# Patient Record
Sex: FEMALE | URBAN - NONMETROPOLITAN AREA
[De-identification: names, ages, dates, MRNs, and addresses within clinical notes are randomized per-mention and may not be internally consistent; named-entity substitution may affect disease eponyms.]

---

## 2018-09-26 ENCOUNTER — APPOINTMENT (OUTPATIENT)
Age: 43
Setting detail: DERMATOLOGY
End: 2018-09-27

## 2018-09-26 DIAGNOSIS — L40.0 PSORIASIS VULGARIS: ICD-10-CM

## 2018-09-26 DIAGNOSIS — B35.3 TINEA PEDIS: ICD-10-CM

## 2018-09-26 DIAGNOSIS — B07.8 OTHER VIRAL WARTS: ICD-10-CM

## 2018-09-26 DIAGNOSIS — L57.0 ACTINIC KERATOSIS: ICD-10-CM

## 2018-09-26 DIAGNOSIS — D485 NEOPLASM OF UNCERTAIN BEHAVIOR OF SKIN: ICD-10-CM

## 2018-09-26 PROBLEM — D48.5 NEOPLASM OF UNCERTAIN BEHAVIOR OF SKIN: Status: ACTIVE | Noted: 2018-09-26

## 2018-09-26 PROCEDURE — 17000 DESTRUCT PREMALG LESION: CPT | Mod: 59

## 2018-09-26 PROCEDURE — OTHER PRESCRIPTION: OTHER

## 2018-09-26 PROCEDURE — 99203 OFFICE O/P NEW LOW 30 MIN: CPT | Mod: 25

## 2018-09-26 PROCEDURE — 11301 SHAVE SKIN LESION 0.6-1.0 CM: CPT | Mod: 59

## 2018-09-26 PROCEDURE — OTHER BIOPSY BY SHAVE METHOD: OTHER

## 2018-09-26 PROCEDURE — 17110 DESTRUCT B9 LESION 1-14: CPT

## 2018-09-26 PROCEDURE — OTHER LIQUID NITROGEN: OTHER

## 2018-09-26 PROCEDURE — 17003 DESTRUCT PREMALG LES 2-14: CPT

## 2018-09-26 PROCEDURE — OTHER SHAVE REMOVAL: OTHER

## 2018-09-26 PROCEDURE — 11100: CPT | Mod: 59

## 2018-09-26 RX ORDER — CLOBETASOL PROPIONATE 0.5 MG/G
CREAM TOPICAL BID
Qty: 1 | Refills: 3 | Status: ERX | COMMUNITY
Start: 2018-09-26

## 2018-09-26 RX ORDER — KETOCONAZOLE 20 MG/G
CREAM TOPICAL
Qty: 1 | Refills: 1 | Status: ERX | COMMUNITY
Start: 2018-09-26

## 2018-09-26 ASSESSMENT — LOCATION DETAILED DESCRIPTION DERM
LOCATION DETAILED: RIGHT PROXIMAL PRETIBIAL REGION
LOCATION DETAILED: RIGHT MEDIAL PLANTAR MIDFOOT
LOCATION DETAILED: RIGHT DORSAL FOOT
LOCATION DETAILED: RIGHT ELBOW
LOCATION DETAILED: RIGHT LATERAL ABDOMEN
LOCATION DETAILED: LEFT CLAVICULAR SKIN
LOCATION DETAILED: LEFT PROXIMAL DORSAL FOREARM
LOCATION DETAILED: RIGHT INFERIOR LATERAL NECK

## 2018-09-26 ASSESSMENT — LOCATION SIMPLE DESCRIPTION DERM
LOCATION SIMPLE: LEFT CLAVICULAR SKIN
LOCATION SIMPLE: ABDOMEN
LOCATION SIMPLE: RIGHT FOOT
LOCATION SIMPLE: RIGHT ELBOW
LOCATION SIMPLE: RIGHT ANTERIOR NECK
LOCATION SIMPLE: LEFT FOREARM
LOCATION SIMPLE: RIGHT PLANTAR SURFACE
LOCATION SIMPLE: RIGHT PRETIBIAL REGION

## 2018-09-26 ASSESSMENT — LOCATION ZONE DERM
LOCATION ZONE: FEET
LOCATION ZONE: NECK
LOCATION ZONE: ARM
LOCATION ZONE: TRUNK
LOCATION ZONE: LEG

## 2018-09-26 ASSESSMENT — TOTAL NUMBER OF LESIONS: # OF LESIONS?: 2

## 2018-09-26 NOTE — PROCEDURE: SHAVE REMOVAL
Consent was obtained from the patient. The risks and benefits to therapy were discussed in detail. Specifically, the risks of infection, scarring, bleeding, prolonged wound healing, incomplete removal, allergy to anesthesia, nerve injury and recurrence were addressed. Prior to the procedure, the treatment site was clearly identified and confirmed by the patient. All components of Universal Protocol/PAUSE Rule completed.
X Size Of Lesion In Cm (Optional): 0
Medical Necessity Clause: This procedure was medically necessary because the lesion that was treated was:
Anesthesia Volume In Cc: 10
Billing Type: Third-Party Bill
Anesthesia Type: 1% lidocaine with epinephrine
Render Post-Care Instructions In Note?: no
Hemostasis: Electrocautery
Medical Necessity Information: It is in your best interest to select a reason for this procedure from the list below. All of these items fulfill various CMS LCD requirements except the new and changing color options.
Detail Level: Detailed
Wound Care: Petrolatum
Notification Instructions: Patient will be notified of biopsy results. However, patient instructed to call the office if not contacted within 2 weeks.
Size Of Lesion In Cm (Required): 0.7
Post-Care Instructions: I reviewed with the patient in detail post-care instructions. Patient is to keep the biopsy site dry overnight, and then apply bacitracin twice daily until healed. Patient may apply hydrogen peroxide soaks to remove any crusting.
Was A Bandage Applied: Yes
Biopsy Method: Dermablade

## 2018-09-26 NOTE — PROCEDURE: BIOPSY BY SHAVE METHOD
Render Post-Care Instructions In Note?: yes
Dressing: bandage
Bill For Surgical Tray: no
Post-Care Instructions: I reviewed with the patient in detail post-care instructions. Patient is to keep the biopsy site dry overnight, and then apply bacitracin twice daily until healed. Patient may apply hydrogen peroxide soaks to remove any crusting.
Billing Type: Third-Party Bill
Anesthesia Type: 1% lidocaine with epinephrine
Curettage Text: The wound bed was treated with curettage after the biopsy was performed.
Anesthesia Volume In Cc (Will Not Render If 0): 0.5
Cryotherapy Text: The wound bed was treated with cryotherapy after the biopsy was performed.
Biopsy Type: H and E
X Size Of Lesion In Cm: 0
Type Of Destruction Used: Curettage
Biopsy Method: Dermablade
Detail Level: Detailed
Wound Care: Petrolatum
Consent: Written consent was obtained and risks were reviewed including but not limited to scarring, infection, bleeding, scabbing, incomplete removal, nerve damage and allergy to anesthesia.
Depth Of Biopsy: dermis
Notification Instructions: Patient will be notified of biopsy results. However, patient instructed to call the office if not contacted within 2 weeks.
Hemostasis: Electrocautery
Silver Nitrate Text: The wound bed was treated with silver nitrate after the biopsy was performed.
Electrodesiccation And Curettage Text: The wound bed was treated with electrodesiccation and curettage after the biopsy was performed.
Electrodesiccation Text: The wound bed was treated with electrodesiccation after the biopsy was performed.

## 2018-09-26 NOTE — PROCEDURE: LIQUID NITROGEN
Medical Necessity Information: It is in your best interest to select a reason for this procedure from the list below. All of these items fulfill various CMS LCD requirements except the new and changing color options.
Total Number Of Aks Treated: 2
Pared With?: curette
Detail Level: Zone
Add 52 Modifier (Optional): no
Medical Necessity Clause: This procedure was medically necessary because the lesions that were treated were:
Duration Of Freeze Thaw-Cycle (Seconds): 0
Post-Care Instructions: I reviewed with the patient in detail post-care instructions. Patient is to wear sunprotection, and avoid picking at any of the treated lesions. Pt may apply Vaseline to crusted or scabbing areas. Will do chemical peel in fall to chest
Detail Level: Detailed
Post-Care Instructions: I reviewed with the patient in detail post-care instructions. Patient is to wear sunprotection, and avoid picking at any of the treated lesions. Pt may apply Vaseline to crusted or scabbing areas.
Consent: The patient's consent was obtained including but not limited to risks of crusting, scabbing, blistering, scarring, darker or lighter pigmentary change, recurrence, incomplete removal and infection.

## 2018-10-25 ENCOUNTER — APPOINTMENT (OUTPATIENT)
Age: 43
Setting detail: DERMATOLOGY
End: 2018-11-01

## 2018-10-25 ENCOUNTER — RX ONLY (RX ONLY)
Age: 43
End: 2018-10-25

## 2018-10-25 PROBLEM — C44.712 BASAL CELL CARCINOMA OF SKIN OF RIGHT LOWER LIMB, INCLUDING HIP: Status: ACTIVE | Noted: 2018-10-25

## 2018-10-25 PROCEDURE — 17260 DSTRJ MAL LES T/A/L 0.5 CM/<: CPT

## 2018-10-25 PROCEDURE — OTHER CURETTAGE AND DESTRUCTION: OTHER

## 2018-10-25 RX ORDER — CEPHALEXIN 500 MG/1
CAPSULE ORAL
Qty: 30 | Refills: 1 | Status: ERX | COMMUNITY
Start: 2018-10-25

## 2018-10-25 NOTE — PROCEDURE: CURETTAGE AND DESTRUCTION
What Was Performed First?: Curettage
Size Of Lesion In Cm: 1.5
Detail Level: Detailed
Post-Care Instructions: I reviewed with the patient in detail post-care instructions. Patient is to keep the area dry for 48 hours, and not to engage in any swimming until the area is healed. Should the patient develop any fevers, chills, bleeding, severe pain patient will contact the office immediately.
Size Of Lesion After Curettage: 0
Bill As A Line Item Or As Units: Line Item
Cautery Type: electrodesiccation
Additional Information: (Optional): The wound was cleaned, and a pressure dressing was applied.  The patient received detailed post-op instructions.
Consent was obtained from the patient. The risks, benefits and alternatives to therapy were discussed in detail. Specifically, the risks of infection, scarring, bleeding, prolonged wound healing, nerve injury, incomplete removal, allergy to anesthesia and recurrence were addressed. Alternatives to ED&C, such as: surgical removal and XRT were also discussed.  Prior to the procedure, the treatment site was clearly identified and confirmed by the patient. All components of Universal Protocol/PAUSE Rule completed.
Render Post-Care Instructions In Note?: yes
Bill For Surgical Tray: no
Anesthesia Type: 1% lidocaine with epinephrine
Number Of Curettages: 3
Total Volume (Ccs): 1

## 2019-02-04 ENCOUNTER — APPOINTMENT (OUTPATIENT)
Age: 44
Setting detail: DERMATOLOGY
End: 2019-02-06

## 2019-02-04 DIAGNOSIS — D485 NEOPLASM OF UNCERTAIN BEHAVIOR OF SKIN: ICD-10-CM

## 2019-02-04 DIAGNOSIS — Z86.007 PERSONAL HISTORY OF IN-SITU NEOPLASM OF SKIN: ICD-10-CM

## 2019-02-04 PROBLEM — Z85.828 PERSONAL HISTORY OF OTHER MALIGNANT NEOPLASM OF SKIN: Status: ACTIVE | Noted: 2019-02-04

## 2019-02-04 PROBLEM — D48.5 NEOPLASM OF UNCERTAIN BEHAVIOR OF SKIN: Status: ACTIVE | Noted: 2019-02-04

## 2019-02-04 PROCEDURE — 11102 TANGNTL BX SKIN SINGLE LES: CPT

## 2019-02-04 PROCEDURE — OTHER COUNSELING: OTHER

## 2019-02-04 PROCEDURE — 99213 OFFICE O/P EST LOW 20 MIN: CPT | Mod: 25

## 2019-02-04 PROCEDURE — OTHER BIOPSY BY SHAVE METHOD: OTHER

## 2019-02-04 ASSESSMENT — LOCATION ZONE DERM
LOCATION ZONE: LEG
LOCATION ZONE: ARM

## 2019-02-04 ASSESSMENT — LOCATION SIMPLE DESCRIPTION DERM
LOCATION SIMPLE: LEFT POSTERIOR UPPER ARM
LOCATION SIMPLE: RIGHT PRETIBIAL REGION

## 2019-02-04 ASSESSMENT — LOCATION DETAILED DESCRIPTION DERM
LOCATION DETAILED: LEFT PROXIMAL POSTERIOR UPPER ARM
LOCATION DETAILED: RIGHT DISTAL PRETIBIAL REGION

## 2019-02-04 NOTE — PROCEDURE: BIOPSY BY SHAVE METHOD
Destruction After The Procedure: No
Curettage Text: The wound bed was treated with curettage after the biopsy was performed.
Silver Nitrate Text: The wound bed was treated with silver nitrate after the biopsy was performed.
Anesthesia Volume In Cc (Will Not Render If 0): 0.5
Dressing: bandage
Render Post-Care Instructions In Note?: yes
Post-Care Instructions: I reviewed with the patient in detail post-care instructions. Patient is to keep the biopsy site dry overnight, and then apply bacitracin twice daily until healed. Patient may apply hydrogen peroxide soaks to remove any crusting.
Cryotherapy Text: The wound bed was treated with cryotherapy after the biopsy was performed.
Additional Anesthesia Volume In Cc (Will Not Render If 0): 0
Notification Instructions: Patient will be notified of biopsy results. However, patient instructed to call the office if not contacted within 2 weeks.
Electrodesiccation And Curettage Text: The wound bed was treated with electrodesiccation and curettage after the biopsy was performed.
Size Of Lesion In Cm: 0.7
Depth Of Biopsy: dermis
Detail Level: Detailed
Wound Care: Petrolatum
Biopsy Method: Dermablade
Biopsy Type: H and E
Anesthesia Type: 1% lidocaine with epinephrine
Electrodesiccation Text: The wound bed was treated with electrodesiccation after the biopsy was performed.
Type Of Destruction Used: Curettage
Hemostasis: Electrocautery
Billing Type: Third-Party Bill
Consent: Written consent was obtained and risks were reviewed including but not limited to scarring, infection, bleeding, scabbing, incomplete removal, nerve damage and allergy to anesthesia.

## 2019-02-04 NOTE — HPI: EVALUATION OF SKIN LESION(S)
What Type Of Note Output Would You Prefer (Optional)?: Bullet Format
How Severe Are Your Spot(S)?: mild
Have Your Spot(S) Been Treated In The Past?: has not been treated
Hpi Title: Evaluation of a Skin Lesion

## 2020-04-21 ENCOUNTER — APPOINTMENT (OUTPATIENT)
Age: 45
Setting detail: DERMATOLOGY
End: 2020-04-24

## 2020-04-21 DIAGNOSIS — L82.1 OTHER SEBORRHEIC KERATOSIS: ICD-10-CM

## 2020-04-21 DIAGNOSIS — D18.0 HEMANGIOMA: ICD-10-CM

## 2020-04-21 DIAGNOSIS — L81.4 OTHER MELANIN HYPERPIGMENTATION: ICD-10-CM

## 2020-04-21 DIAGNOSIS — Z86.007 PERSONAL HISTORY OF IN-SITU NEOPLASM OF SKIN: ICD-10-CM

## 2020-04-21 PROBLEM — D18.01 HEMANGIOMA OF SKIN AND SUBCUTANEOUS TISSUE: Status: ACTIVE | Noted: 2020-04-21

## 2020-04-21 PROBLEM — Z85.828 PERSONAL HISTORY OF OTHER MALIGNANT NEOPLASM OF SKIN: Status: ACTIVE | Noted: 2020-04-21

## 2020-04-21 PROCEDURE — OTHER REASSURANCE: OTHER

## 2020-04-21 PROCEDURE — OTHER COUNSELING: OTHER

## 2020-04-21 PROCEDURE — 99214 OFFICE O/P EST MOD 30 MIN: CPT

## 2020-04-21 ASSESSMENT — LOCATION DETAILED DESCRIPTION DERM
LOCATION DETAILED: LEFT MEDIAL BREAST 10-11:00 REGION
LOCATION DETAILED: RIGHT DISTAL PRETIBIAL REGION
LOCATION DETAILED: LEFT MEDIAL UPPER BACK
LOCATION DETAILED: RIGHT VENTRAL PROXIMAL FOREARM

## 2020-04-21 ASSESSMENT — LOCATION ZONE DERM
LOCATION ZONE: ARM
LOCATION ZONE: TRUNK
LOCATION ZONE: LEG

## 2020-04-21 ASSESSMENT — LOCATION SIMPLE DESCRIPTION DERM
LOCATION SIMPLE: RIGHT PRETIBIAL REGION
LOCATION SIMPLE: RIGHT FOREARM
LOCATION SIMPLE: LEFT BREAST
LOCATION SIMPLE: LEFT UPPER BACK

## 2021-11-19 ENCOUNTER — APPOINTMENT (OUTPATIENT)
Age: 46
Setting detail: DERMATOLOGY
End: 2021-11-22

## 2021-11-19 DIAGNOSIS — L82.1 OTHER SEBORRHEIC KERATOSIS: ICD-10-CM

## 2021-11-19 DIAGNOSIS — D22 MELANOCYTIC NEVI: ICD-10-CM

## 2021-11-19 DIAGNOSIS — L81.4 OTHER MELANIN HYPERPIGMENTATION: ICD-10-CM

## 2021-11-19 DIAGNOSIS — D18.0 HEMANGIOMA: ICD-10-CM

## 2021-11-19 PROBLEM — D22.71 MELANOCYTIC NEVI OF RIGHT LOWER LIMB, INCLUDING HIP: Status: ACTIVE | Noted: 2021-11-19

## 2021-11-19 PROBLEM — D22.5 MELANOCYTIC NEVI OF TRUNK: Status: ACTIVE | Noted: 2021-11-19

## 2021-11-19 PROBLEM — D22.72 MELANOCYTIC NEVI OF LEFT LOWER LIMB, INCLUDING HIP: Status: ACTIVE | Noted: 2021-11-19

## 2021-11-19 PROBLEM — D22.61 MELANOCYTIC NEVI OF RIGHT UPPER LIMB, INCLUDING SHOULDER: Status: ACTIVE | Noted: 2021-11-19

## 2021-11-19 PROBLEM — D18.01 HEMANGIOMA OF SKIN AND SUBCUTANEOUS TISSUE: Status: ACTIVE | Noted: 2021-11-19

## 2021-11-19 PROBLEM — D22.62 MELANOCYTIC NEVI OF LEFT UPPER LIMB, INCLUDING SHOULDER: Status: ACTIVE | Noted: 2021-11-19

## 2021-11-19 PROCEDURE — OTHER COUNSELING: OTHER

## 2021-11-19 PROCEDURE — OTHER SUNSCREEN RECOMMENDATIONS: OTHER

## 2021-11-19 PROCEDURE — OTHER MIPS QUALITY: OTHER

## 2021-11-19 PROCEDURE — 99213 OFFICE O/P EST LOW 20 MIN: CPT

## 2021-11-19 ASSESSMENT — LOCATION DETAILED DESCRIPTION DERM
LOCATION DETAILED: LEFT INFERIOR UPPER BACK
LOCATION DETAILED: RIGHT SUPERIOR UPPER BACK
LOCATION DETAILED: LEFT SUPERIOR UPPER BACK
LOCATION DETAILED: LEFT ANTERIOR SHOULDER
LOCATION DETAILED: RIGHT DISTAL PRETIBIAL REGION
LOCATION DETAILED: MIDDLE STERNUM
LOCATION DETAILED: RIGHT ANTERIOR PROXIMAL THIGH
LOCATION DETAILED: EPIGASTRIC SKIN
LOCATION DETAILED: RIGHT PROXIMAL POSTERIOR UPPER ARM
LOCATION DETAILED: RIGHT INFERIOR UPPER BACK
LOCATION DETAILED: LEFT PROXIMAL DORSAL FOREARM
LOCATION DETAILED: RIGHT PROXIMAL POSTERIOR THIGH
LOCATION DETAILED: LEFT PROXIMAL POSTERIOR UPPER ARM
LOCATION DETAILED: LEFT BUTTOCK
LOCATION DETAILED: RIGHT DISTAL PRETIBIAL REGION
LOCATION DETAILED: RIGHT INFERIOR MEDIAL MIDBACK
LOCATION DETAILED: RIGHT MEDIAL SUPERIOR CHEST
LOCATION DETAILED: RIGHT PROXIMAL DORSAL FOREARM
LOCATION DETAILED: LEFT PROXIMAL PRETIBIAL REGION
LOCATION DETAILED: RIGHT POSTERIOR SHOULDER
LOCATION DETAILED: PERIUMBILICAL SKIN
LOCATION DETAILED: LEFT ANTERIOR PROXIMAL THIGH
LOCATION DETAILED: LEFT POSTERIOR SHOULDER
LOCATION DETAILED: RIGHT PROXIMAL PRETIBIAL REGION
LOCATION DETAILED: RIGHT ANTERIOR SHOULDER

## 2021-11-19 ASSESSMENT — LOCATION SIMPLE DESCRIPTION DERM
LOCATION SIMPLE: RIGHT LOWER BACK
LOCATION SIMPLE: LEFT SHOULDER
LOCATION SIMPLE: LEFT POSTERIOR UPPER ARM
LOCATION SIMPLE: LEFT PRETIBIAL REGION
LOCATION SIMPLE: RIGHT UPPER BACK
LOCATION SIMPLE: RIGHT PRETIBIAL REGION
LOCATION SIMPLE: LEFT UPPER BACK
LOCATION SIMPLE: RIGHT FOREARM
LOCATION SIMPLE: RIGHT SHOULDER
LOCATION SIMPLE: LEFT BUTTOCK
LOCATION SIMPLE: CHEST
LOCATION SIMPLE: ABDOMEN
LOCATION SIMPLE: RIGHT POSTERIOR THIGH
LOCATION SIMPLE: LEFT FOREARM
LOCATION SIMPLE: LEFT THIGH
LOCATION SIMPLE: RIGHT THIGH
LOCATION SIMPLE: RIGHT PRETIBIAL REGION
LOCATION SIMPLE: RIGHT POSTERIOR UPPER ARM

## 2021-11-19 ASSESSMENT — LOCATION ZONE DERM
LOCATION ZONE: LEG
LOCATION ZONE: LEG
LOCATION ZONE: ARM
LOCATION ZONE: TRUNK

## 2021-11-19 NOTE — PROCEDURE: MIPS QUALITY
Quality 130: Documentation Of Current Medications In The Medical Record: Current Medications Documented
Quality 431: Preventive Care And Screening: Unhealthy Alcohol Use - Screening: Patient not identified as an unhealthy alcohol user when screened for unhealthy alcohol use using a systematic screening method
Detail Level: Detailed
Quality 110: Preventive Care And Screening: Influenza Immunization: Influenza Immunization Ordered or Recommended, but not Administered due to system reason